# Patient Record
Sex: FEMALE | Race: BLACK OR AFRICAN AMERICAN | NOT HISPANIC OR LATINO | Employment: FULL TIME | URBAN - METROPOLITAN AREA
[De-identification: names, ages, dates, MRNs, and addresses within clinical notes are randomized per-mention and may not be internally consistent; named-entity substitution may affect disease eponyms.]

---

## 2023-05-09 ENCOUNTER — OFFICE VISIT (OUTPATIENT)
Dept: PRIMARY CARE | Facility: CLINIC | Age: 48
End: 2023-05-09
Payer: COMMERCIAL

## 2023-05-09 VITALS
SYSTOLIC BLOOD PRESSURE: 142 MMHG | DIASTOLIC BLOOD PRESSURE: 86 MMHG | BODY MASS INDEX: 36.84 KG/M2 | TEMPERATURE: 98.2 F | WEIGHT: 195 LBS | HEART RATE: 64 BPM

## 2023-05-09 DIAGNOSIS — D72.829 LEUKOCYTOSIS, UNSPECIFIED TYPE: ICD-10-CM

## 2023-05-09 DIAGNOSIS — E78.2 MIXED HYPERLIPIDEMIA: ICD-10-CM

## 2023-05-09 DIAGNOSIS — I10 PRIMARY HYPERTENSION: Primary | ICD-10-CM

## 2023-05-09 PROCEDURE — 3077F SYST BP >= 140 MM HG: CPT | Performed by: INTERNAL MEDICINE

## 2023-05-09 PROCEDURE — 3079F DIAST BP 80-89 MM HG: CPT | Performed by: INTERNAL MEDICINE

## 2023-05-09 PROCEDURE — 99213 OFFICE O/P EST LOW 20 MIN: CPT | Performed by: INTERNAL MEDICINE

## 2023-05-09 RX ORDER — LISINOPRIL AND HYDROCHLOROTHIAZIDE 12.5; 2 MG/1; MG/1
2 TABLET ORAL DAILY
Qty: 180 TABLET | Refills: 1 | Status: SHIPPED | OUTPATIENT
Start: 2023-05-09 | End: 2024-01-15 | Stop reason: SDUPTHER

## 2023-05-09 RX ORDER — LISINOPRIL AND HYDROCHLOROTHIAZIDE 12.5; 2 MG/1; MG/1
1 TABLET ORAL DAILY
COMMUNITY
End: 2023-05-09 | Stop reason: SDUPTHER

## 2023-05-09 RX ORDER — MULTIVITAMIN
1 TABLET ORAL DAILY
COMMUNITY
Start: 2020-02-03

## 2023-05-09 ASSESSMENT — ENCOUNTER SYMPTOMS
HEADACHES: 0
SEIZURES: 0
CARDIOVASCULAR NEGATIVE: 1
CONSTITUTIONAL NEGATIVE: 1
ALLERGIC/IMMUNOLOGIC NEGATIVE: 1
PSYCHIATRIC NEGATIVE: 1
MUSCULOSKELETAL NEGATIVE: 1
ENDOCRINE NEGATIVE: 1
EYES NEGATIVE: 1
GASTROINTESTINAL NEGATIVE: 1
RESPIRATORY NEGATIVE: 1

## 2023-05-09 NOTE — PROGRESS NOTES
Subjective   Patient ID: Viv Baer is a 48 y.o. female who presents for Follow-up (Pt here for office visit).      No data recorded    Patient blood pressure is improved but still 8 needs to go down she is on lisinopril hydrochlorothiazide she smokes 1 pack of cigarettes daily          Review of Systems   Constitutional: Negative.    HENT: Negative.     Eyes: Negative.    Respiratory: Negative.     Cardiovascular: Negative.    Gastrointestinal: Negative.    Endocrine: Negative.    Genitourinary: Negative.    Musculoskeletal: Negative.    Allergic/Immunologic: Negative.    Neurological:  Negative for seizures and headaches.   Psychiatric/Behavioral: Negative.     All other systems reviewed and are negative.      Objective   /86   Pulse 64   Temp 36.8 °C (98.2 °F) (Temporal)   Wt 88.5 kg (195 lb)   BMI 36.84 kg/m²     Physical Exam  Vitals reviewed.   Constitutional:       Appearance: Normal appearance.   HENT:      Head: Normocephalic.   Cardiovascular:      Rate and Rhythm: Normal rate and regular rhythm.   Musculoskeletal:         General: Normal range of motion.   Neurological:      General: No focal deficit present.      Mental Status: She is alert and oriented to person, place, and time.      Cranial Nerves: No cranial nerve deficit.   Psychiatric:         Mood and Affect: Mood normal.         Thought Content: Thought content normal.         Assessment/Plan   Problem List Items Addressed This Visit    None  Visit Diagnoses       Primary hypertension    -  Primary    Relevant Medications    lisinopriL-hydrochlorothiazide 20-12.5 mg tablet    Leukocytosis, unspecified type        Relevant Orders    CBC and Auto Differential    Mixed hyperlipidemia        Relevant Orders    Comprehensive Metabolic Panel    Lipid Panel          Patient advised to quit smoking.  Also get established with a gynecologist since she has family history of ovarian cancer in her mother she was advised to get mammograms yearly  she does have dense breast tissue so she needs to establish with gynecologist   She should follow DASH diet and we have increased lisinopril to 2 tablets daily

## 2023-06-06 ENCOUNTER — LAB (OUTPATIENT)
Dept: LAB | Facility: LAB | Age: 48
End: 2023-06-06
Payer: COMMERCIAL

## 2023-06-06 DIAGNOSIS — D72.829 LEUKOCYTOSIS, UNSPECIFIED TYPE: ICD-10-CM

## 2023-06-06 DIAGNOSIS — E78.2 MIXED HYPERLIPIDEMIA: ICD-10-CM

## 2023-06-06 LAB
ALANINE AMINOTRANSFERASE (SGPT) (U/L) IN SER/PLAS: 10 U/L (ref 7–45)
ALBUMIN (G/DL) IN SER/PLAS: 4.2 G/DL (ref 3.4–5)
ALKALINE PHOSPHATASE (U/L) IN SER/PLAS: 53 U/L (ref 33–110)
ANION GAP IN SER/PLAS: 14 MMOL/L (ref 10–20)
ASPARTATE AMINOTRANSFERASE (SGOT) (U/L) IN SER/PLAS: 16 U/L (ref 9–39)
BASOPHILS (10*3/UL) IN BLOOD BY AUTOMATED COUNT: 0.04 X10E9/L (ref 0–0.1)
BASOPHILS/100 LEUKOCYTES IN BLOOD BY AUTOMATED COUNT: 0.3 % (ref 0–2)
BILIRUBIN TOTAL (MG/DL) IN SER/PLAS: 0.7 MG/DL (ref 0–1.2)
CALCIUM (MG/DL) IN SER/PLAS: 9.1 MG/DL (ref 8.6–10.3)
CARBON DIOXIDE, TOTAL (MMOL/L) IN SER/PLAS: 24 MMOL/L (ref 21–32)
CHLORIDE (MMOL/L) IN SER/PLAS: 104 MMOL/L (ref 98–107)
CHOLESTEROL (MG/DL) IN SER/PLAS: 206 MG/DL (ref 0–199)
CHOLESTEROL IN HDL (MG/DL) IN SER/PLAS: 44.9 MG/DL
CHOLESTEROL/HDL RATIO: 4.6
CREATININE (MG/DL) IN SER/PLAS: 0.82 MG/DL (ref 0.5–1.05)
EOSINOPHILS (10*3/UL) IN BLOOD BY AUTOMATED COUNT: 0.21 X10E9/L (ref 0–0.7)
EOSINOPHILS/100 LEUKOCYTES IN BLOOD BY AUTOMATED COUNT: 1.7 % (ref 0–6)
ERYTHROCYTE DISTRIBUTION WIDTH (RATIO) BY AUTOMATED COUNT: 14.4 % (ref 11.5–14.5)
ERYTHROCYTE MEAN CORPUSCULAR HEMOGLOBIN CONCENTRATION (G/DL) BY AUTOMATED: 32.2 G/DL (ref 32–36)
ERYTHROCYTE MEAN CORPUSCULAR VOLUME (FL) BY AUTOMATED COUNT: 93 FL (ref 80–100)
ERYTHROCYTES (10*6/UL) IN BLOOD BY AUTOMATED COUNT: 4.85 X10E12/L (ref 4–5.2)
GFR FEMALE: 87 ML/MIN/1.73M2
GLUCOSE (MG/DL) IN SER/PLAS: 77 MG/DL (ref 74–99)
HEMATOCRIT (%) IN BLOOD BY AUTOMATED COUNT: 45.1 % (ref 36–46)
HEMOGLOBIN (G/DL) IN BLOOD: 14.5 G/DL (ref 12–16)
IMMATURE GRANULOCYTES/100 LEUKOCYTES IN BLOOD BY AUTOMATED COUNT: 0.3 % (ref 0–0.9)
LDL: 144 MG/DL (ref 0–99)
LEUKOCYTES (10*3/UL) IN BLOOD BY AUTOMATED COUNT: 12.1 X10E9/L (ref 4.4–11.3)
LYMPHOCYTES (10*3/UL) IN BLOOD BY AUTOMATED COUNT: 4.01 X10E9/L (ref 1.2–4.8)
LYMPHOCYTES/100 LEUKOCYTES IN BLOOD BY AUTOMATED COUNT: 33.3 % (ref 13–44)
MONOCYTES (10*3/UL) IN BLOOD BY AUTOMATED COUNT: 0.78 X10E9/L (ref 0.1–1)
MONOCYTES/100 LEUKOCYTES IN BLOOD BY AUTOMATED COUNT: 6.5 % (ref 2–10)
NEUTROPHILS (10*3/UL) IN BLOOD BY AUTOMATED COUNT: 6.97 X10E9/L (ref 1.2–7.7)
NEUTROPHILS/100 LEUKOCYTES IN BLOOD BY AUTOMATED COUNT: 57.9 % (ref 40–80)
PLATELETS (10*3/UL) IN BLOOD AUTOMATED COUNT: 207 X10E9/L (ref 150–450)
POTASSIUM (MMOL/L) IN SER/PLAS: 4.5 MMOL/L (ref 3.5–5.3)
PROTEIN TOTAL: 7.1 G/DL (ref 6.4–8.2)
SODIUM (MMOL/L) IN SER/PLAS: 137 MMOL/L (ref 136–145)
TRIGLYCERIDE (MG/DL) IN SER/PLAS: 87 MG/DL (ref 0–149)
UREA NITROGEN (MG/DL) IN SER/PLAS: 16 MG/DL (ref 6–23)
VLDL: 17 MG/DL (ref 0–40)

## 2023-06-06 PROCEDURE — 36415 COLL VENOUS BLD VENIPUNCTURE: CPT

## 2023-06-06 PROCEDURE — 80061 LIPID PANEL: CPT

## 2023-06-06 PROCEDURE — 80053 COMPREHEN METABOLIC PANEL: CPT

## 2023-06-06 PROCEDURE — 85025 COMPLETE CBC W/AUTO DIFF WBC: CPT

## 2023-09-12 ENCOUNTER — OFFICE VISIT (OUTPATIENT)
Dept: PRIMARY CARE | Facility: CLINIC | Age: 48
End: 2023-09-12
Payer: COMMERCIAL

## 2023-09-12 VITALS
WEIGHT: 197 LBS | HEART RATE: 68 BPM | BODY MASS INDEX: 37.22 KG/M2 | DIASTOLIC BLOOD PRESSURE: 70 MMHG | SYSTOLIC BLOOD PRESSURE: 120 MMHG | TEMPERATURE: 97.9 F

## 2023-09-12 DIAGNOSIS — E78.2 MIXED HYPERLIPIDEMIA: ICD-10-CM

## 2023-09-12 DIAGNOSIS — Z12.11 COLON CANCER SCREENING: ICD-10-CM

## 2023-09-12 DIAGNOSIS — I10 PRIMARY HYPERTENSION: Primary | ICD-10-CM

## 2023-09-12 PROBLEM — F17.200 SMOKER: Status: ACTIVE | Noted: 2023-09-12

## 2023-09-12 PROCEDURE — 99213 OFFICE O/P EST LOW 20 MIN: CPT | Performed by: INTERNAL MEDICINE

## 2023-09-12 PROCEDURE — 3078F DIAST BP <80 MM HG: CPT | Performed by: INTERNAL MEDICINE

## 2023-09-12 PROCEDURE — 3074F SYST BP LT 130 MM HG: CPT | Performed by: INTERNAL MEDICINE

## 2023-09-12 ASSESSMENT — ENCOUNTER SYMPTOMS
GASTROINTESTINAL NEGATIVE: 1
EYES NEGATIVE: 1
RESPIRATORY NEGATIVE: 1
CARDIOVASCULAR NEGATIVE: 1
NEUROLOGICAL NEGATIVE: 1
PSYCHIATRIC NEGATIVE: 1
HEMATOLOGIC/LYMPHATIC NEGATIVE: 1

## 2023-09-12 NOTE — PROGRESS NOTES
Subjective   Patient ID: Viv Baer is a 48 y.o. female who presents for Follow-up (Pt here for office visit and lab results).    HPI and here for office visit she has history of hypertension plus she is a smoker 1 pack of cigarettes daily she had colonoscopy done about 8 to 10 years ago.    Review of Systems   HENT: Negative.     Eyes: Negative.    Respiratory: Negative.     Cardiovascular: Negative.    Gastrointestinal: Negative.    Genitourinary: Negative.    Neurological: Negative.    Hematological: Negative.    Psychiatric/Behavioral: Negative.     All other systems reviewed and are negative.      Objective   /70   Pulse 68   Temp 36.6 °C (97.9 °F) (Temporal)   Wt 89.4 kg (197 lb)   BMI 37.22 kg/m²     Physical Exam  Cardiovascular:      Rate and Rhythm: Normal rate and regular rhythm.   Musculoskeletal:         General: Normal range of motion.   Neurological:      General: No focal deficit present.      Mental Status: She is oriented to person, place, and time.   Psychiatric:         Mood and Affect: Mood normal.         Behavior: Behavior normal.       Assessment/Plan   Problem List Items Addressed This Visit       Primary hypertension - Primary    Mixed hyperlipidemia     Patient advised about smoking cessation she will continue lisinopril hydrochlorothiazide for hypertension patient also advised about repeat colonoscopy for colon cancer screening.

## 2024-01-15 DIAGNOSIS — I10 PRIMARY HYPERTENSION: ICD-10-CM

## 2024-01-16 RX ORDER — LISINOPRIL AND HYDROCHLOROTHIAZIDE 12.5; 2 MG/1; MG/1
2 TABLET ORAL DAILY
Qty: 180 TABLET | Refills: 1 | Status: SHIPPED | OUTPATIENT
Start: 2024-01-16 | End: 2024-03-12 | Stop reason: SINTOL

## 2024-03-05 ENCOUNTER — APPOINTMENT (OUTPATIENT)
Dept: SURGERY | Facility: CLINIC | Age: 49
End: 2024-03-05
Payer: COMMERCIAL

## 2024-03-12 ENCOUNTER — OFFICE VISIT (OUTPATIENT)
Dept: PRIMARY CARE | Facility: CLINIC | Age: 49
End: 2024-03-12
Payer: COMMERCIAL

## 2024-03-12 VITALS
DIASTOLIC BLOOD PRESSURE: 80 MMHG | HEART RATE: 74 BPM | SYSTOLIC BLOOD PRESSURE: 136 MMHG | BODY MASS INDEX: 36.28 KG/M2 | WEIGHT: 192 LBS

## 2024-03-12 DIAGNOSIS — E78.2 MIXED HYPERLIPIDEMIA: ICD-10-CM

## 2024-03-12 DIAGNOSIS — I10 PRIMARY HYPERTENSION: Primary | ICD-10-CM

## 2024-03-12 DIAGNOSIS — R53.83 OTHER FATIGUE: ICD-10-CM

## 2024-03-12 DIAGNOSIS — R05.3 CHRONIC COUGH: ICD-10-CM

## 2024-03-12 PROCEDURE — 3075F SYST BP GE 130 - 139MM HG: CPT | Performed by: INTERNAL MEDICINE

## 2024-03-12 PROCEDURE — 3079F DIAST BP 80-89 MM HG: CPT | Performed by: INTERNAL MEDICINE

## 2024-03-12 PROCEDURE — 99213 OFFICE O/P EST LOW 20 MIN: CPT | Performed by: INTERNAL MEDICINE

## 2024-03-12 RX ORDER — LOSARTAN POTASSIUM AND HYDROCHLOROTHIAZIDE 12.5; 5 MG/1; MG/1
1 TABLET ORAL DAILY
Qty: 90 TABLET | Refills: 1 | Status: SHIPPED | OUTPATIENT
Start: 2024-03-12 | End: 2024-09-08

## 2024-03-12 ASSESSMENT — ENCOUNTER SYMPTOMS
CONSTITUTIONAL NEGATIVE: 1
MUSCULOSKELETAL NEGATIVE: 1
CARDIOVASCULAR NEGATIVE: 1
PSYCHIATRIC NEGATIVE: 1
HYPERTENSION: 1
RESPIRATORY NEGATIVE: 1
GASTROINTESTINAL NEGATIVE: 1
ENDOCRINE NEGATIVE: 1
ALLERGIC/IMMUNOLOGIC NEGATIVE: 1
NEUROLOGICAL NEGATIVE: 1

## 2024-03-12 NOTE — PROGRESS NOTES
Subjective   Patient ID: Viv Baer is a 48 y.o. female who presents for Hypertension and Cough.    Hypertension    Patient here for office visit she has hypertension she does have chronic cough she is a smoker she thinks her cough is due to lisinopril which we will change she was advised to get chest x-ray done stop smoking and if the cough persist get CAT scan of the chest    Review of Systems   Constitutional: Negative.    HENT: Negative.     Respiratory: Negative.     Cardiovascular: Negative.    Gastrointestinal: Negative.    Endocrine: Negative.    Genitourinary: Negative.    Musculoskeletal: Negative.    Allergic/Immunologic: Negative.    Neurological: Negative.    Psychiatric/Behavioral: Negative.     All other systems reviewed and are negative.      Objective   /80   Pulse 74   Wt 87.1 kg (192 lb)   BMI 36.28 kg/m²     Physical Exam  Vitals reviewed.   Constitutional:       Appearance: Normal appearance.   HENT:      Head: Normocephalic.      Nose: Nose normal.   Eyes:      Pupils: Pupils are equal, round, and reactive to light.   Neurological:      Mental Status: She is alert.         Assessment/Plan   Problem List Items Addressed This Visit             ICD-10-CM    Primary hypertension - Primary I10    Relevant Medications    losartan-hydrochlorothiazide (Hyzaar) 50-12.5 mg tablet    Mixed hyperlipidemia E78.2    Relevant Orders    Cholesterol, LDL Direct    Lipid Panel    Comprehensive Metabolic Panel     Other Visit Diagnoses         Codes    Other fatigue     R53.83    Relevant Orders    CBC and Auto Differential    Vitamin D 25-Hydroxy,Total (for eval of Vitamin D levels)    Chronic cough     R05.3    Relevant Orders    XR chest 2 views          Chest x-ray ordered smoking cessation advised patient was given options to quit smoking patient blood pressure medicine changed from lisinopril to losartan hydrochlorothiazide she was also advised to get annual mammogram and screening colonoscopy  for cancer screening also advised to get LDCT for lung cancer screening advise regular follow-up following DASH diet and healthy lifestyle encouraged and will be helpful

## 2024-03-14 DIAGNOSIS — I10 PRIMARY HYPERTENSION: ICD-10-CM

## 2024-06-18 ENCOUNTER — APPOINTMENT (OUTPATIENT)
Dept: PRIMARY CARE | Facility: CLINIC | Age: 49
End: 2024-06-18
Payer: COMMERCIAL

## 2024-06-18 ENCOUNTER — LAB (OUTPATIENT)
Dept: LAB | Facility: LAB | Age: 49
End: 2024-06-18
Payer: COMMERCIAL

## 2024-06-18 VITALS
BODY MASS INDEX: 37.95 KG/M2 | DIASTOLIC BLOOD PRESSURE: 76 MMHG | WEIGHT: 201 LBS | TEMPERATURE: 97.9 F | HEIGHT: 61 IN | SYSTOLIC BLOOD PRESSURE: 128 MMHG | HEART RATE: 72 BPM

## 2024-06-18 DIAGNOSIS — J20.9 ACUTE BRONCHITIS, UNSPECIFIED ORGANISM: ICD-10-CM

## 2024-06-18 DIAGNOSIS — I10 PRIMARY HYPERTENSION: ICD-10-CM

## 2024-06-18 DIAGNOSIS — E78.2 MIXED HYPERLIPIDEMIA: ICD-10-CM

## 2024-06-18 DIAGNOSIS — F17.200 SMOKER: ICD-10-CM

## 2024-06-18 DIAGNOSIS — R53.83 OTHER FATIGUE: ICD-10-CM

## 2024-06-18 DIAGNOSIS — Z00.00 ANNUAL PHYSICAL EXAM: Primary | ICD-10-CM

## 2024-06-18 LAB
25(OH)D3 SERPL-MCNC: 31 NG/ML (ref 30–100)
ALBUMIN SERPL BCP-MCNC: 4.3 G/DL (ref 3.4–5)
ALP SERPL-CCNC: 71 U/L (ref 33–110)
ALT SERPL W P-5'-P-CCNC: 11 U/L (ref 7–45)
ANION GAP SERPL CALC-SCNC: 10 MMOL/L (ref 10–20)
AST SERPL W P-5'-P-CCNC: 15 U/L (ref 9–39)
BASOPHILS # BLD AUTO: 0.04 X10*3/UL (ref 0–0.1)
BASOPHILS NFR BLD AUTO: 0.3 %
BILIRUB SERPL-MCNC: 0.4 MG/DL (ref 0–1.2)
BUN SERPL-MCNC: 15 MG/DL (ref 6–23)
CALCIUM SERPL-MCNC: 9.5 MG/DL (ref 8.6–10.3)
CHLORIDE SERPL-SCNC: 105 MMOL/L (ref 98–107)
CHOLEST SERPL-MCNC: 205 MG/DL (ref 0–199)
CHOLESTEROL/HDL RATIO: 4.5
CO2 SERPL-SCNC: 29 MMOL/L (ref 21–32)
CREAT SERPL-MCNC: 0.77 MG/DL (ref 0.5–1.05)
EGFRCR SERPLBLD CKD-EPI 2021: >90 ML/MIN/1.73M*2
EOSINOPHIL # BLD AUTO: 0.31 X10*3/UL (ref 0–0.7)
EOSINOPHIL NFR BLD AUTO: 2.4 %
ERYTHROCYTE [DISTWIDTH] IN BLOOD BY AUTOMATED COUNT: 13.6 % (ref 11.5–14.5)
GLUCOSE SERPL-MCNC: 89 MG/DL (ref 74–99)
HCT VFR BLD AUTO: 43.5 % (ref 36–46)
HDLC SERPL-MCNC: 45.6 MG/DL
HGB BLD-MCNC: 14.1 G/DL (ref 12–16)
IMM GRANULOCYTES # BLD AUTO: 0.03 X10*3/UL (ref 0–0.7)
IMM GRANULOCYTES NFR BLD AUTO: 0.2 % (ref 0–0.9)
LDLC SERPL CALC-MCNC: 126 MG/DL
LYMPHOCYTES # BLD AUTO: 3.94 X10*3/UL (ref 1.2–4.8)
LYMPHOCYTES NFR BLD AUTO: 30 %
MCH RBC QN AUTO: 30 PG (ref 26–34)
MCHC RBC AUTO-ENTMCNC: 32.4 G/DL (ref 32–36)
MCV RBC AUTO: 93 FL (ref 80–100)
MONOCYTES # BLD AUTO: 1.01 X10*3/UL (ref 0.1–1)
MONOCYTES NFR BLD AUTO: 7.7 %
NEUTROPHILS # BLD AUTO: 7.8 X10*3/UL (ref 1.2–7.7)
NEUTROPHILS NFR BLD AUTO: 59.4 %
NON HDL CHOLESTEROL: 159 MG/DL (ref 0–149)
NRBC BLD-RTO: 0 /100 WBCS (ref 0–0)
PLATELET # BLD AUTO: 298 X10*3/UL (ref 150–450)
POTASSIUM SERPL-SCNC: 4 MMOL/L (ref 3.5–5.3)
PROT SERPL-MCNC: 7 G/DL (ref 6.4–8.2)
RBC # BLD AUTO: 4.7 X10*6/UL (ref 4–5.2)
SODIUM SERPL-SCNC: 140 MMOL/L (ref 136–145)
TRIGL SERPL-MCNC: 168 MG/DL (ref 0–149)
VLDL: 34 MG/DL (ref 0–40)
WBC # BLD AUTO: 13.1 X10*3/UL (ref 4.4–11.3)

## 2024-06-18 PROCEDURE — 99396 PREV VISIT EST AGE 40-64: CPT | Performed by: INTERNAL MEDICINE

## 2024-06-18 PROCEDURE — 82306 VITAMIN D 25 HYDROXY: CPT

## 2024-06-18 PROCEDURE — 80061 LIPID PANEL: CPT

## 2024-06-18 PROCEDURE — 83721 ASSAY OF BLOOD LIPOPROTEIN: CPT

## 2024-06-18 PROCEDURE — 85025 COMPLETE CBC W/AUTO DIFF WBC: CPT

## 2024-06-18 PROCEDURE — 36415 COLL VENOUS BLD VENIPUNCTURE: CPT

## 2024-06-18 PROCEDURE — 3078F DIAST BP <80 MM HG: CPT | Performed by: INTERNAL MEDICINE

## 2024-06-18 PROCEDURE — 3074F SYST BP LT 130 MM HG: CPT | Performed by: INTERNAL MEDICINE

## 2024-06-18 PROCEDURE — 80053 COMPREHEN METABOLIC PANEL: CPT

## 2024-06-18 RX ORDER — BENZONATATE 100 MG/1
100 CAPSULE ORAL 3 TIMES DAILY PRN
Qty: 42 CAPSULE | Refills: 0 | Status: SHIPPED | OUTPATIENT
Start: 2024-06-18 | End: 2024-07-18

## 2024-06-18 RX ORDER — BENZONATATE 100 MG/1
100 CAPSULE ORAL 3 TIMES DAILY PRN
Qty: 42 CAPSULE | Refills: 0 | Status: SHIPPED | OUTPATIENT
Start: 2024-06-18 | End: 2024-06-18

## 2024-06-18 ASSESSMENT — ENCOUNTER SYMPTOMS
COUGH: 1
NEUROLOGICAL NEGATIVE: 1
CONSTITUTIONAL NEGATIVE: 1
PSYCHIATRIC NEGATIVE: 1
EYES NEGATIVE: 1
MUSCULOSKELETAL NEGATIVE: 1

## 2024-06-18 NOTE — PROGRESS NOTES
"Subjective   Patient ID: Viv Baer is a 49 y.o. female who presents for Annual Exam (Pt here for yearly physical  and bp follow up).    HPI   Patient here for wellness exam.  She has history of hypertension.  She had respiratory infection for 1-1/2 weeks initially with nasal sinus congestion and then cough with colored phlegm now only cough no fever no chills she did not get tested for any COVID or RSV  Review of Systems   Constitutional: Negative.    HENT:  Positive for congestion.    Eyes: Negative.    Respiratory:  Positive for cough.    Genitourinary: Negative.    Musculoskeletal: Negative.    Neurological: Negative.    Psychiatric/Behavioral: Negative.     All other systems reviewed and are negative.      Objective   /76   Pulse 72   Temp 36.6 °C (97.9 °F) (Temporal)   Ht 1.549 m (5' 1\")   Wt 91.2 kg (201 lb)   BMI 37.98 kg/m²     Physical Exam  Vitals reviewed.   Constitutional:       Appearance: Normal appearance.   HENT:      Head: Atraumatic.   Eyes:      Conjunctiva/sclera: Conjunctivae normal.   Cardiovascular:      Rate and Rhythm: Normal rate.      Pulses: Normal pulses.   Pulmonary:      Effort: Pulmonary effort is normal.      Breath sounds: Normal breath sounds.   Lymphadenopathy:      Cervical: No cervical adenopathy.   Neurological:      General: No focal deficit present.      Mental Status: She is oriented to person, place, and time.   Psychiatric:         Mood and Affect: Mood normal.         Thought Content: Thought content normal.         Assessment/Plan   Problem List Items Addressed This Visit             ICD-10-CM    Primary hypertension I10    Mixed hyperlipidemia E78.2    Annual physical exam - Primary Z00.00     Other Visit Diagnoses         Codes    Acute bronchitis, unspecified organism     J20.9    Relevant Medications    benzonatate (Tessalon) 100 mg capsule          Lung exam unremarkable no need for x-ray or antibiotic he gave her benzonatate for symptomatic relief.  " Patient has 18-pack-year history of smoking if she does not quit she will require LDCT for lung cancer screening we also advised annual mammograms and also screening colonoscopy for colon cancer screening.  Continue losartan hydrochlorothiazide for hypertension.

## 2024-06-19 LAB — LDLC SERPL DIRECT ASSAY-MCNC: 143 MG/DL (ref 0–129)

## 2024-10-18 ENCOUNTER — APPOINTMENT (OUTPATIENT)
Dept: PRIMARY CARE | Facility: CLINIC | Age: 49
End: 2024-10-18
Payer: COMMERCIAL

## 2024-10-18 VITALS
SYSTOLIC BLOOD PRESSURE: 136 MMHG | BODY MASS INDEX: 37.41 KG/M2 | DIASTOLIC BLOOD PRESSURE: 74 MMHG | WEIGHT: 198 LBS | TEMPERATURE: 97.3 F | HEART RATE: 64 BPM

## 2024-10-18 DIAGNOSIS — E78.2 MIXED HYPERLIPIDEMIA: ICD-10-CM

## 2024-10-18 DIAGNOSIS — I10 PRIMARY HYPERTENSION: Primary | ICD-10-CM

## 2024-10-18 DIAGNOSIS — F17.200 SMOKER: ICD-10-CM

## 2024-10-18 PROCEDURE — 3075F SYST BP GE 130 - 139MM HG: CPT | Performed by: INTERNAL MEDICINE

## 2024-10-18 PROCEDURE — 3078F DIAST BP <80 MM HG: CPT | Performed by: INTERNAL MEDICINE

## 2024-10-18 PROCEDURE — 99213 OFFICE O/P EST LOW 20 MIN: CPT | Performed by: INTERNAL MEDICINE

## 2024-10-18 RX ORDER — ROSUVASTATIN CALCIUM 5 MG/1
5 TABLET, COATED ORAL DAILY
Qty: 30 TABLET | Refills: 3 | Status: SHIPPED | OUTPATIENT
Start: 2024-10-18 | End: 2025-02-15

## 2024-10-18 ASSESSMENT — ENCOUNTER SYMPTOMS
HEMATOLOGIC/LYMPHATIC NEGATIVE: 1
ENDOCRINE NEGATIVE: 1
NEUROLOGICAL NEGATIVE: 1
CARDIOVASCULAR NEGATIVE: 1
PSYCHIATRIC NEGATIVE: 1
GASTROINTESTINAL NEGATIVE: 1
EYES NEGATIVE: 1
CONSTITUTIONAL NEGATIVE: 1
RESPIRATORY NEGATIVE: 1

## 2024-10-18 ASSESSMENT — PATIENT HEALTH QUESTIONNAIRE - PHQ9
SUM OF ALL RESPONSES TO PHQ9 QUESTIONS 1 & 2: 0
1. LITTLE INTEREST OR PLEASURE IN DOING THINGS: NOT AT ALL
2. FEELING DOWN, DEPRESSED OR HOPELESS: NOT AT ALL

## 2024-10-18 NOTE — PROGRESS NOTES
Subjective   Patient ID: Viv Baer is a 49 y.o. female who presents for Follow-up (routine).    HPI   Patient here for office visit she did not get colonoscopy done yet still smokes pack of cigarettes daily blood pressure improved but still on the high side.  Review of Systems   Constitutional: Negative.    HENT: Negative.     Eyes: Negative.    Respiratory: Negative.     Cardiovascular: Negative.    Gastrointestinal: Negative.    Endocrine: Negative.    Genitourinary: Negative.    Neurological: Negative.    Hematological: Negative.    Psychiatric/Behavioral: Negative.     All other systems reviewed and are negative.      Objective   /74   Pulse 64   Temp 36.3 °C (97.3 °F) (Temporal)   Wt 89.8 kg (198 lb)   LMP 10/07/2024   BMI 37.41 kg/m²     Physical Exam  Vitals reviewed.   Constitutional:       Appearance: Normal appearance.   HENT:      Head: Normocephalic.   Cardiovascular:      Rate and Rhythm: Normal rate and regular rhythm.   Pulmonary:      Effort: Pulmonary effort is normal.      Breath sounds: Normal breath sounds.   Musculoskeletal:      Right lower leg: No edema.      Left lower leg: No edema.   Neurological:      General: No focal deficit present.      Mental Status: She is alert and oriented to person, place, and time. Mental status is at baseline.      Sensory: No sensory deficit.       Assessment/Plan   Problem List Items Addressed This Visit             ICD-10-CM    Primary hypertension - Primary I10    Smoker F17.200    Mixed hyperlipidemia E78.2     We advised smoking cessation.  Continue losartan hydrochlorothiazide for hypertension continue statins for hyperlipidemia we advised her about annual mammograms for breast cancer screening and also advised her to get colonoscopy for colon cancer screening

## 2024-12-10 DIAGNOSIS — I10 PRIMARY HYPERTENSION: ICD-10-CM

## 2024-12-10 RX ORDER — LOSARTAN POTASSIUM AND HYDROCHLOROTHIAZIDE 12.5; 5 MG/1; MG/1
1 TABLET ORAL DAILY
Qty: 90 TABLET | Refills: 1 | Status: SHIPPED | OUTPATIENT
Start: 2024-12-10 | End: 2025-06-08

## 2025-02-18 ENCOUNTER — APPOINTMENT (OUTPATIENT)
Dept: PRIMARY CARE | Facility: CLINIC | Age: 50
End: 2025-02-18
Payer: COMMERCIAL

## 2025-02-18 VITALS
BODY MASS INDEX: 38.17 KG/M2 | DIASTOLIC BLOOD PRESSURE: 74 MMHG | TEMPERATURE: 97.7 F | HEART RATE: 64 BPM | WEIGHT: 202 LBS | SYSTOLIC BLOOD PRESSURE: 132 MMHG

## 2025-02-18 DIAGNOSIS — I10 PRIMARY HYPERTENSION: Primary | ICD-10-CM

## 2025-02-18 DIAGNOSIS — E78.2 MIXED HYPERLIPIDEMIA: ICD-10-CM

## 2025-02-18 DIAGNOSIS — Z12.31 ENCOUNTER FOR SCREENING MAMMOGRAM FOR MALIGNANT NEOPLASM OF BREAST: ICD-10-CM

## 2025-02-18 DIAGNOSIS — F17.200 SMOKER: ICD-10-CM

## 2025-02-18 PROCEDURE — 3078F DIAST BP <80 MM HG: CPT | Performed by: INTERNAL MEDICINE

## 2025-02-18 PROCEDURE — 99213 OFFICE O/P EST LOW 20 MIN: CPT | Performed by: INTERNAL MEDICINE

## 2025-02-18 PROCEDURE — 3075F SYST BP GE 130 - 139MM HG: CPT | Performed by: INTERNAL MEDICINE

## 2025-02-18 ASSESSMENT — ENCOUNTER SYMPTOMS
HEMATOLOGIC/LYMPHATIC NEGATIVE: 1
EYES NEGATIVE: 1
PSYCHIATRIC NEGATIVE: 1
CONSTITUTIONAL NEGATIVE: 1
ENDOCRINE NEGATIVE: 1
NEUROLOGICAL NEGATIVE: 1
RESPIRATORY NEGATIVE: 1

## 2025-02-18 NOTE — PROGRESS NOTES
Subjective   Patient ID: Viv Baer is a 49 y.o. female who presents for Follow-up (Pt here for office visit will get labs done).    HPI cough went away ever since we change lisinopril to losartan.  Patient smokes 1 pack of cigarettes daily she has about 18 years pack year history of smoking.  She from that criteria is not due for LDCT but will need in the next couple years.  Colonoscopy recommended if not done earlier we also recommend stopping smoking    Review of Systems   Constitutional: Negative.    HENT: Negative.     Eyes: Negative.    Respiratory: Negative.     Endocrine: Negative.    Genitourinary: Negative.    Neurological: Negative.    Hematological: Negative.    Psychiatric/Behavioral: Negative.     All other systems reviewed and are negative.      Objective   /74   Pulse 64   Temp 36.5 °C (97.7 °F) (Temporal)   Wt 91.6 kg (202 lb)   BMI 38.17 kg/m²     Physical Exam  Vitals reviewed.   Constitutional:       Appearance: Normal appearance.   Neck:      Vascular: No carotid bruit.   Cardiovascular:      Rate and Rhythm: Normal rate and regular rhythm.      Pulses: Normal pulses.      Heart sounds: Normal heart sounds.   Pulmonary:      Effort: Pulmonary effort is normal.      Breath sounds: Normal breath sounds.   Musculoskeletal:      Right lower leg: No edema.      Left lower leg: No edema.   Lymphadenopathy:      Cervical: No cervical adenopathy.   Neurological:      General: No focal deficit present.      Mental Status: She is alert and oriented to person, place, and time.      Cranial Nerves: No cranial nerve deficit.       Assessment/Plan   Problem List Items Addressed This Visit             ICD-10-CM    Primary hypertension - Primary I10    Smoker F17.200    Mixed hyperlipidemia E78.2   We recommend smoking cessation we can offer Wellbutrin or patches for smoking cessation even Chantix can be considered.  Continue losartan hydrochlorothiazide for hypertension continue rosuvastatin for  hyperlipidemia follow-up with us in June or July for wellness exam.

## 2025-02-21 ENCOUNTER — APPOINTMENT (OUTPATIENT)
Dept: RADIOLOGY | Facility: CLINIC | Age: 50
End: 2025-02-21
Payer: COMMERCIAL

## 2025-02-26 LAB
ALBUMIN SERPL-MCNC: 4.3 G/DL (ref 3.6–5.1)
ALP SERPL-CCNC: 63 U/L (ref 37–153)
ALT SERPL-CCNC: 10 U/L (ref 6–29)
ANION GAP SERPL CALCULATED.4IONS-SCNC: 8 MMOL/L (CALC) (ref 7–17)
AST SERPL-CCNC: 13 U/L (ref 10–35)
BILIRUB SERPL-MCNC: 0.5 MG/DL (ref 0.2–1.2)
BUN SERPL-MCNC: 16 MG/DL (ref 7–25)
CALCIUM SERPL-MCNC: 9.4 MG/DL (ref 8.6–10.4)
CHLORIDE SERPL-SCNC: 104 MMOL/L (ref 98–110)
CHOLEST SERPL-MCNC: 210 MG/DL
CHOLEST/HDLC SERPL: 4.1 (CALC)
CO2 SERPL-SCNC: 29 MMOL/L (ref 20–32)
CREAT SERPL-MCNC: 0.79 MG/DL (ref 0.5–1.03)
EGFRCR SERPLBLD CKD-EPI 2021: 91 ML/MIN/1.73M2
GLUCOSE SERPL-MCNC: 92 MG/DL (ref 65–99)
HDLC SERPL-MCNC: 51 MG/DL
LDLC SERPL CALC-MCNC: 140 MG/DL (CALC)
LDLC SERPL DIRECT ASSAY-MCNC: 151 MG/DL
NONHDLC SERPL-MCNC: 159 MG/DL (CALC)
POTASSIUM SERPL-SCNC: 4.1 MMOL/L (ref 3.5–5.3)
PROT SERPL-MCNC: 7.1 G/DL (ref 6.1–8.1)
SODIUM SERPL-SCNC: 141 MMOL/L (ref 135–146)
TRIGL SERPL-MCNC: 91 MG/DL

## 2025-04-17 ENCOUNTER — ANCILLARY PROCEDURE (OUTPATIENT)
Dept: URGENT CARE | Age: 50
End: 2025-04-17
Payer: COMMERCIAL

## 2025-04-17 ENCOUNTER — OFFICE VISIT (OUTPATIENT)
Dept: URGENT CARE | Age: 50
End: 2025-04-17
Payer: COMMERCIAL

## 2025-04-17 VITALS
TEMPERATURE: 98.1 F | WEIGHT: 200 LBS | DIASTOLIC BLOOD PRESSURE: 85 MMHG | OXYGEN SATURATION: 98 % | HEART RATE: 64 BPM | RESPIRATION RATE: 20 BRPM | SYSTOLIC BLOOD PRESSURE: 144 MMHG | BODY MASS INDEX: 37.76 KG/M2 | HEIGHT: 61 IN

## 2025-04-17 DIAGNOSIS — M79.671 ACUTE PAIN OF RIGHT FOOT: Primary | ICD-10-CM

## 2025-04-17 DIAGNOSIS — M77.51 TENDINITIS OF RIGHT FOOT: ICD-10-CM

## 2025-04-17 DIAGNOSIS — M79.671 ACUTE PAIN OF RIGHT FOOT: ICD-10-CM

## 2025-04-17 PROCEDURE — 73630 X-RAY EXAM OF FOOT: CPT | Mod: RIGHT SIDE | Performed by: FAMILY MEDICINE

## 2025-04-17 RX ORDER — DICLOFENAC SODIUM 10 MG/G
4 GEL TOPICAL 4 TIMES DAILY PRN
Qty: 200 G | Refills: 3 | Status: SHIPPED | OUTPATIENT
Start: 2025-04-17

## 2025-04-17 RX ORDER — PREDNISONE 20 MG/1
20 TABLET ORAL 2 TIMES DAILY
Qty: 10 TABLET | Refills: 0 | Status: SHIPPED | OUTPATIENT
Start: 2025-04-17 | End: 2025-04-22

## 2025-04-17 NOTE — PROGRESS NOTES
"Subjective   Patient ID: Viv Baer is a 49 y.o. female. She presents today with a chief complaint of Other (Right foot pain x 3-4 days - no injury - painful & swollen). Patient presents with a 4 day history of right foot pain and swelling. The swelling has been intermittent, worse with ambulating. She denies calf pain, shortness of breath, chest pain and fevers. She has been taking Ibuprofen, which is not helping. She denies any specific injury, but she is concerned about a fracture.    History of Present Illness  HPI    Past Medical History  Allergies as of 04/17/2025 - Reviewed 04/17/2025   Allergen Reaction Noted    Sulfa (sulfonamide antibiotics) Other 05/09/2023       Prescriptions Prior to Admission[1]     Medical History[2]    Surgical History[3]     reports that she has been smoking cigarettes. She has never used smokeless tobacco. She reports current alcohol use. She reports that she does not use drugs.    Review of Systems  Review of Systems                               Objective    Vitals:    04/17/25 1333   BP: 144/85   BP Location: Left arm   Patient Position: Sitting   Pulse: 64   Resp: 20   Temp: 36.7 °C (98.1 °F)   SpO2: 98%   Weight: 90.7 kg (200 lb)   Height: 1.549 m (5' 1\")     Patient's last menstrual period was 03/21/2025 (exact date).    Physical Exam  Vitals and nursing note reviewed.   Constitutional:       General: She is not in acute distress.     Appearance: Normal appearance.   Cardiovascular:      Rate and Rhythm: Normal rate and regular rhythm.      Pulses: Normal pulses.   Musculoskeletal:         General: Normal range of motion.      Right ankle: Normal.      Right Achilles Tendon: Normal.      Right foot: Normal range of motion and normal capillary refill. Swelling and bunion present. No bony tenderness. Normal pulse.      Comments: Mild erythema, moderate edema and tenderness to palpation, dorsum of right foot   Skin:     General: Skin is warm.      Capillary Refill: Capillary " refill takes less than 2 seconds.   Neurological:      Mental Status: She is alert.      Sensory: No sensory deficit.         Procedures    Point of Care Test & Imaging Results from this visit  No results found for this visit on 04/17/25.   Imaging  XR foot right 3+ views  Result Date: 4/17/2025  No acute findings right foot.   Signed by: Trevor Herr 4/17/2025 2:26 PM Dictation workstation:   [id number]      Cardiology, Vascular, and Other Imaging  No other imaging results found for the past 2 days      Diagnostic study results (if any) were reviewed by Dimple Collier MD.    Assessment/Plan   Allergies, medications, history, and pertinent labs/EKGs/Imaging reviewed by Dimple Collier MD.     Medical Decision Making      Orders and Diagnoses  Diagnoses and all orders for this visit:  Acute pain of right foot  -     XR foot right 3+ views; Future  -     predniSONE (Deltasone) 20 mg tablet; Take 1 tablet (20 mg) by mouth 2 times a day for 5 days.  -     diclofenac sodium (Voltaren) 1 % gel; Apply 4.5 inches (4 g) topically 4 times a day as needed (pain).  Tendinitis of right foot      Medical Admin Record      Patient disposition: Home    Electronically signed by Dimple Collier MD  3:26 PM           [1] (Not in a hospital admission)   [2]   Past Medical History:  Diagnosis Date    Cellulitis, unspecified 02/03/2020    MRSA cellulitis    Personal history of other diseases of the respiratory system     History of chronic obstructive lung disease   [3]   Past Surgical History:  Procedure Laterality Date    OTHER SURGICAL HISTORY  02/03/2020    [place] tooth extraction    OTHER SURGICAL HISTORY  02/03/2020    Tubal ligation

## 2025-04-17 NOTE — PATIENT INSTRUCTIONS
Prednisone as directed; take with food  Apply Voltaren as needed  Follow up with new or worsening symptoms

## 2025-05-09 ENCOUNTER — TELEPHONE (OUTPATIENT)
Dept: PRIMARY CARE | Facility: CLINIC | Age: 50
End: 2025-05-09

## 2025-05-09 ENCOUNTER — OFFICE VISIT (OUTPATIENT)
Dept: PRIMARY CARE | Facility: CLINIC | Age: 50
End: 2025-05-09
Payer: COMMERCIAL

## 2025-05-09 VITALS
BODY MASS INDEX: 38.36 KG/M2 | TEMPERATURE: 97.8 F | WEIGHT: 203 LBS | DIASTOLIC BLOOD PRESSURE: 74 MMHG | HEART RATE: 64 BPM | SYSTOLIC BLOOD PRESSURE: 132 MMHG

## 2025-05-09 DIAGNOSIS — M79.671 FOOT PAIN, RIGHT: Primary | ICD-10-CM

## 2025-05-09 PROCEDURE — 3078F DIAST BP <80 MM HG: CPT | Performed by: INTERNAL MEDICINE

## 2025-05-09 PROCEDURE — 3075F SYST BP GE 130 - 139MM HG: CPT | Performed by: INTERNAL MEDICINE

## 2025-05-09 PROCEDURE — 99213 OFFICE O/P EST LOW 20 MIN: CPT | Performed by: INTERNAL MEDICINE

## 2025-05-09 RX ORDER — CELECOXIB 200 MG/1
200 CAPSULE ORAL DAILY
Qty: 30 CAPSULE | Refills: 1 | Status: SHIPPED | OUTPATIENT
Start: 2025-05-09 | End: 2025-05-09

## 2025-05-09 RX ORDER — CELECOXIB 200 MG/1
200 CAPSULE ORAL DAILY
Qty: 30 CAPSULE | Refills: 1 | Status: SHIPPED | OUTPATIENT
Start: 2025-05-09 | End: 2026-05-09

## 2025-05-09 ASSESSMENT — ENCOUNTER SYMPTOMS
CONSTITUTIONAL NEGATIVE: 1
RESPIRATORY NEGATIVE: 1
EYES NEGATIVE: 1
PSYCHIATRIC NEGATIVE: 1
HEMATOLOGIC/LYMPHATIC NEGATIVE: 1
ENDOCRINE NEGATIVE: 1
CARDIOVASCULAR NEGATIVE: 1
GASTROINTESTINAL NEGATIVE: 1

## 2025-05-09 ASSESSMENT — PATIENT HEALTH QUESTIONNAIRE - PHQ9
2. FEELING DOWN, DEPRESSED OR HOPELESS: NOT AT ALL
SUM OF ALL RESPONSES TO PHQ9 QUESTIONS 1 & 2: 0
1. LITTLE INTEREST OR PLEASURE IN DOING THINGS: NOT AT ALL

## 2025-05-09 NOTE — PROGRESS NOTES
Subjective   Patient ID: Viv Baer is a 50 y.o. female who presents for acute (Pt here for rt foot pain ans swelling at times pain goes up to ankle  approx 1 month. Went to [place] urgent care xray dx tendonitis).    HPI   Patient here for acute visit she has been hurting in the fourth and she had x-rays done she was told she has tendinitis.  She gets a lot of pain and sometimes swelling differential diagnosis is stress fracture versus ligamentous injury versus tendon inflammation versus gout  Review of Systems   Constitutional: Negative.    HENT: Negative.     Eyes: Negative.    Respiratory: Negative.     Cardiovascular: Negative.    Gastrointestinal: Negative.    Endocrine: Negative.    Genitourinary: Negative.    Musculoskeletal:         Foot pain   Skin: Negative.    Hematological: Negative.    Psychiatric/Behavioral: Negative.     All other systems reviewed and are negative.      Objective   /74   Pulse 64   Temp 36.6 °C (97.8 °F) (Temporal)   Wt 92.1 kg (203 lb)   LMP 03/21/2025 (Exact Date)   BMI 38.36 kg/m²     Physical Exam  Vitals reviewed.   Constitutional:       Appearance: Normal appearance.   Musculoskeletal:      Comments: Pain on entire dorsum of foot with minimal swelling and slight warmth   Neurological:      Mental Status: She is alert.         Assessment/Plan   Problem List Items Addressed This Visit    None  Visit Diagnoses         Codes      Foot pain, right    -  Primary M79.671    Relevant Medications    celecoxib (CeleBREX) 200 mg capsule    Other Relevant Orders    Uric acid    Sedimentation Rate    Adult Referral to Orthopedics and Sports Medicine        Patient was given Celebrex prescription and advised to take PPI for GI prophylaxis orthopedic consultation ordered uric acid level sed rate has been made.

## 2025-05-11 LAB
ERYTHROCYTE [SEDIMENTATION RATE] IN BLOOD BY WESTERGREN METHOD: 2 MM/H
URATE SERPL-MCNC: 4.8 MG/DL (ref 2.5–7)

## 2025-05-14 ENCOUNTER — OFFICE VISIT (OUTPATIENT)
Dept: ORTHOPEDIC SURGERY | Facility: CLINIC | Age: 50
End: 2025-05-14
Payer: COMMERCIAL

## 2025-05-14 DIAGNOSIS — M21.42 FLAT FEET: ICD-10-CM

## 2025-05-14 DIAGNOSIS — M76.821 RIGHT TIBIALIS TENDINITIS: ICD-10-CM

## 2025-05-14 DIAGNOSIS — M21.41 FLAT FEET: ICD-10-CM

## 2025-05-14 PROCEDURE — L4361 PNEUMA/VAC WALK BOOT PRE OTS: HCPCS | Performed by: STUDENT IN AN ORGANIZED HEALTH CARE EDUCATION/TRAINING PROGRAM

## 2025-05-14 PROCEDURE — 99214 OFFICE O/P EST MOD 30 MIN: CPT | Performed by: STUDENT IN AN ORGANIZED HEALTH CARE EDUCATION/TRAINING PROGRAM

## 2025-05-14 RX ORDER — NAPROXEN 500 MG/1
500 TABLET ORAL
Qty: 28 TABLET | Refills: 1 | Status: SHIPPED | OUTPATIENT
Start: 2025-05-14 | End: 2025-06-11

## 2025-05-14 NOTE — PROGRESS NOTES
Acute Injury New Patient Visit    Patient ID: Viv Baer is a 50 y.o. female.   History of Present Illness  The patient is a 50-year-old female who presents with right foot and ankle pain without any known injury. X-rays were referred by Dr. Abdalla on 04/17/2025.    Right Foot and Ankle Pain  - Persistent pain in right foot for a month, localized to dorsal aspect, occasionally radiating laterally to ankle.  - Accompanied by swelling and erythema.  - No numbness or tingling.  - No history of trauma.  - Pain management with Celebrex daily, Tylenol, and ibuprofen alternately.  - Completed steroid course without relief.    Callus  - Callus on plantar aspect managed by another provider.  - No diabetes.  - Episodes of tripping over toe.    Supplemental information: Work involves standing and walking from 5:15 AM to 3:20 PM, four days a week, exacerbating discomfort.    SOCIAL HISTORY  - Works as a nurse       Assessment & Plan  1. Right tibialis anterior tendinitis  - Prescribe naproxen 500 mg BID for 2 weeks  - Discontinue Celebrex and ibuprofen  - Provide tall boot for 10-14 days  - Order physical therapy  - Advise rest, ice, elevation, use boot when ambulating  - Provide work note until Monday    2. Right posterior tibial tendinitis  - Recommend OTC orthotics for arch support  - Apply same treatment plan as tibialis anterior tendinitis    3. Pes planus  - Recommend OTC orthotics for arch support  - Order physical therapy for flatfeet and associated tendinitis    Follow-up  - Follow up in 2 weeks, no x-rays if better       Assessment:   Problem List Items Addressed This Visit    None  Visit Diagnoses         Right tibialis tendinitis        Relevant Orders    Walking Boot Tall    Referral to Physical Therapy      Flat feet        Relevant Orders    Walking Boot Tall    Referral to Physical Therapy            Diagnostics: Reviewed all relevant imaging including x-ray, MRI, CT, and US.    Results  X-ray of right  foot and ankle (04/17/2025): Small Achilles spur, no other abnormalities.     Procedure:  Procedures  Physical Exam  - Integumentary: Callus on plantar aspect    Musculoskeletal:    - Right Ankle: Lateral tenderness, intact Achilles    - Right Foot: Dorsal pain, discoloration, flatfoot       Orders Placed This Encounter    Walking Boot Tall    Referral to Physical Therapy      At the conclusion of the visit there were no further questions by the patient/family regarding their plan of care.  Patient was instructed to call or return with any issues, questions, or concerns regarding their injury and/or treatment plan described above.     05/14/25 at 2:05 PM - Dhruv Thompson DO    Office: [phone]    This note was prepared using voice recognition software.  The details of this note are correct and have been reviewed, and corrected to the best of my ability.  Some grammatical errors may persist related to the Dragon software.  This medical note was created with the assistance of artificial intelligence (AI) for documentation purposes. The content has been reviewed and confirmed by the healthcare provider for accuracy and completeness. Patient consented to the use of audio recording and use of AI during their visit.

## 2025-05-14 NOTE — LETTER
May 14, 2025     Patient: Viv Baer   YOB: 1975   Date of Visit: 5/14/2025       To Whom It May Concern:    Viv Baer was seen in my clinic on 5/14/2025 at 1:00 pm. She may go back to work on Monday 05/19/25 with the walking boot on at all time and pain as tolerates.    If you have any questions or concerns, please don't hesitate to call.         Sincerely,         Dhruv Thompson, DO

## 2025-05-14 NOTE — PROGRESS NOTES
Physical Therapy     Physical Therapy Evaluation                                        Patient Name: Viv Baer  MRN: [id number]  :  1975  Today's Date: 5/15/2025  Time Calculation  Start Time: 931  Stop Time: 1004  Time Calculation (min): 33 min  PT Evaluation Time Entry  PT Evaluation (Low) Time Entry: 23  PT Therapeutic Procedures Time Entry  Therapeutic Exercise Time Entry: 10     Per chart review:  1. Right tibialis anterior tendinitis  - Prescribe naproxen 500 mg BID for 2 weeks  - Discontinue Celebrex and ibuprofen  - Provide tall boot for 10-14 days  - Order physical therapy  - Advise rest, ice, elevation, use boot when ambulating  - Provide work note until Monday    2. Right posterior tibial tendinitis  - Recommend OTC orthotics for arch support  - Apply same treatment plan as tibialis anterior tendinitis    3. Pes planus  - Recommend OTC orthotics for arch support  - Order physical therapy for flatfeet and associated tendinitis   Reason for Visit  Referred by: Dhruv Thompson DO  Referral DX date: 25     Diagnosis:  1. Right tibialis tendinitis    2. 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 Could be landing on R foot when descending large Jeep. Has boot and used it when walking dog and gave good support.   Current Episode  Date of Onset:  2025  Mechanism of injury:  overuse  Chief Complaint:  R foot pain with walking   Relevant incidents/injuries:  none  Progression of symptoms:  worse  Previous treatments:  none  Relevant medical history:  HTN     Prior level of function: IND with all ADL's  Functional limitations: walking dog, housework, cooking  Home environment: 4 steps to get in, only walking one step at a  time when in boot.   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     Precautions: none  Precautions  STEADI Fall Risk Score (The score of 4 or more indicates an increased risk of falling): 1        Pain:  Location: R foot  Current pain: 5/10; Range: 5-15*/10  Aggravating factor: walking  Alleviating factor: boot, rest, voltaren, meds     Objective:     AROM/PROM  Lower Extremity Right Left   Hip Flex  100 WNL   Hip Ext  30    Knee Flex  130    Knee Ext  0    DF  30    PF  40    Hip Abd  40    Hip Add  20       MMT:   Lower Extremity Right Left   Hip Flex 4/5 4/5   Hip Ext 4/5 4/5   Knee Flex 4/5 4/5   Knee Ext 4/5 4/5   DF 4+/5 4+/5   PF 4+/5 4+/5   Hip Abd 4+/5 4+/5   Hip Add 4/5 4/5     Anterior drawer (pt in supine, 20deg PF): -       Positive=increased joint motion, possible ATF lig injury  Talar tilt (calcaneofibular lig; knee 90deg flex, foot neutral, inversion/eversion of talus): +       Inversion=calcaneofibular lig +       Eversion=deltoid lig +  External rotation stress test (knee flex at 90, PROM into lateral rot): -        Positive=possible syndesmosis injury/high ankle   Squeeze test (pt supine, squeeze tib/fib): -       Positive=possible syndesmosis injury  Powell test (pt prone, squeeze calf): -       Positive=possible achilles tendon third deg tear     Balance:   Impaired due to pain on R     Gait:   Shortened time in SLS on R, short step length on L    Sensation:   Patient denies numbness/tingling of bilateral LOWER extremities.  Light Touch: normal    Coordination:  normal     Outcome Measure:  Other Measures  Lower Extremity Funtional Score (LEFS): 29/80       Treatment:  Provided education regarding POC, goals created, reasoning for assessments performed and results of those assessments. Additionally, provided  education regarding scheduling proposal with pt asked about scheduling preferences, call in/no show policy, and pt provides verbal confirmation of understanding.  - Long Sitting Calf Stretch with Strap  10 reps  - Gastroc Stretch with Foot at Wall  10 reps  - Seated Arch Lifts 10 reps  - Towel Scrunches 10 reps  - Long Sitting Plantar Fascia Stretch with Towel 10 reps  - Foot Roller Plantar Massage  10 reps  - Ankle Eversion with Resistance  10 reps  - Ankle Inversion with Resistance  10 reps  - Ankle Dorsiflexion with Resistance - Long Sitting Calf Stretch with Strap 10 reps    OP Education: HEP:   Access Code: [email or web address]  URL: [email or web address]  Date: 05/15/2025  Prepared by: Tip Mathews    Exercises  - Long Sitting Calf Stretch with Strap  - 1 x daily - 7 x weekly - 3 sets - 10 reps  - Gastroc Stretch with Foot at Wall  - 1 x daily - 7 x weekly - 3 sets - 10 reps  - Seated Arch Lifts  - 1 x daily - 7 x weekly - 3 sets - 10 reps  - Towel Scrunches  - 1 x daily - 7 x weekly - 3 sets - 10 reps  - Long Sitting Plantar Fascia Stretch with Towel  - 1 x daily - 7 x weekly - 3 sets - 10 reps  - Foot Roller Plantar Massage  - 1 x daily - 7 x weekly - 3 sets - 10 reps  - Ankle Eversion with Resistance  - 1 x daily - 7 x weekly - 3 sets - 10 reps  - Ankle Inversion with Resistance  - 1 x daily - 7 x weekly - 3 sets - 10 reps  - Ankle Dorsiflexion with Resistance  - 1 x daily - 7 x weekly - 3 sets - 10 reps    Assessment:  Patient is a 50 y.o. FEMALE presents to [facility] for assessment and treatment of mobility related impairments s/p R ankle and foot pain complicated by overuse during work and recreation.   Patient is alert and oriented x3. Patient presents with medical diagnosis of R Tibialis tendinitis contributing to compensatory soft tissue dysfunction, pain, stiffness and weakness of the R LE. Significant past medical history/past surgical history includes HTN. Skilled care is  needed to progress the patient back to these activities without exacerbating symptoms. Patient requires skilled PT services to address the problems identified and the individualized patient's goals as outlined in the problems and goals section of this evaluation. A skilled PT is required to address these key impairments and to provide and progress with an appropriate home exercise program. Patient does not have significant PMH influencing Rx and reports motivation to return to FUNCTIONAL ACTIVITY and RETURN TO WORK. Patient demonstrates to be a good candidate for physical therapy with good rehab potential and verbalized a good understanding of their diagnosis, prognosis and treatment. Pt is motivated and has strong family support which reinforces their potential for improvement.  Pt would benefit from skilled physical therapy to improve strength, balance and decrease ankle pain.  Goals have been established and reviewed with the patient.      PT Assessment Results: Decreased strength, Decreased range of motion, Decreased endurance, Impaired balance, Decreased mobility  Rehab Prognosis: Good  Evaluation/Treatment Tolerance: Patient tolerated treatment well    Plan:  Frequency/duration:   Treatment/Interventions: Cryotherapy, Dry needling, Education/ Instruction, Electrical stimulation, Fluidotherapy, Gait training, Hot pack, Manual therapy, Neuromuscular re-education, Orthosis fit/ training, Self care/ home management, Taping techniques, Therapeutic activities, Therapeutic exercises, Ultrasound, Vasopneumatic device  PT Plan: Skilled PT  PT Frequency: 1 time per week  Duration: 6 weeks  Onset Date: 05/14/25  Certification Period Start Date: 05/15/25  Certification Period End Date: 08/13/25     Rehab Potential: Good       Goals:  Understand and demonstrate a home exercise program that will support and continue the process of neural plasticity resulting in continued improvement of lower extremity function, increased  mobility, and safety.  Patient will demonstrate the ability to ascend/descend one flight of stairs reciprocally and without an increase in pain to improve function within the home and the community  Patient will report pain of 0/10 at rest, and no greater than 3/10 with any activity including standing, walking, stairs, and transfers  Patient will demonstrate the ability to ambulate >/= 150' without any major deviations in gait to indicate improved mobility within the home and the community   Patient will demonstrate an increased score in LEFS score by 9 points to </=  38/80 to meet established MCID for the outcome measure (baseline 5/15/2025 : 29/80).   Pt will report walking dog for 1 hour while having no more than 2/10 pain during and after to demo improved pain management in order to return to training her new dog.      Complexity:  Low complexity evaluation due to a past medical history WITHOUT personal factors and/or comorbidities that could impact the POC, 23 minutes for evaluation, examination of body systems completed with the patient presenting with a stable condition, and clinical decision making.    Tip Mathews, PT  5/15/2025

## 2025-05-15 ENCOUNTER — EVALUATION (OUTPATIENT)
Dept: PHYSICAL THERAPY | Facility: CLINIC | Age: 50
End: 2025-05-15
Payer: COMMERCIAL

## 2025-05-15 DIAGNOSIS — M21.41 FLAT FEET: ICD-10-CM

## 2025-05-15 DIAGNOSIS — M21.42 FLAT FEET: ICD-10-CM

## 2025-05-15 DIAGNOSIS — M76.821 RIGHT TIBIALIS TENDINITIS: ICD-10-CM

## 2025-05-15 PROCEDURE — 97161 PT EVAL LOW COMPLEX 20 MIN: CPT | Mod: GP

## 2025-05-15 PROCEDURE — 97110 THERAPEUTIC EXERCISES: CPT | Mod: GP

## 2025-05-15 ASSESSMENT — ENCOUNTER SYMPTOMS
LOSS OF SENSATION IN FEET: 0
DEPRESSION: 0
OCCASIONAL FEELINGS OF UNSTEADINESS: 0

## 2025-05-19 ENCOUNTER — APPOINTMENT (OUTPATIENT)
Dept: PHYSICAL THERAPY | Facility: CLINIC | Age: 50
End: 2025-05-19
Payer: COMMERCIAL

## 2025-05-22 ENCOUNTER — TREATMENT (OUTPATIENT)
Dept: PHYSICAL THERAPY | Facility: CLINIC | Age: 50
End: 2025-05-22
Payer: COMMERCIAL

## 2025-05-22 DIAGNOSIS — M21.42 FLAT FEET: Primary | ICD-10-CM

## 2025-05-22 DIAGNOSIS — M21.41 FLAT FEET: Primary | ICD-10-CM

## 2025-05-22 DIAGNOSIS — M76.821 RIGHT TIBIALIS TENDINITIS: ICD-10-CM

## 2025-05-22 PROCEDURE — 97110 THERAPEUTIC EXERCISES: CPT | Mod: GP | Performed by: PHYSICAL THERAPIST

## 2025-05-22 ASSESSMENT — PAIN SCALES - GENERAL: PAINLEVEL_OUTOF10: 2

## 2025-05-22 ASSESSMENT — PAIN - FUNCTIONAL ASSESSMENT: PAIN_FUNCTIONAL_ASSESSMENT: 0-10

## 2025-05-22 NOTE — PROGRESS NOTES
PHYSICAL THERAPY TREATMENT    Patient name:  Viv Baer    MRN:  [id number]    :  1975    Today's Date:  25    Time Calculation  Start Time: 1214  Stop Time: 1253  Time Calculation (min): 39 min     PT Therapeutic Procedures Time Entry  Therapeutic Exercise Time Entry: 38       Referral by:  Referred By: Dhruv Thompson    Diagnoses:  Diagnosis and Precautions: M76.821 (ICD-10-CM) - Right tibialis tendinitis  M21.41,M21.42 (ICD-10-CM) - Flat feet    Assessment/Plan:  Therapeutic exercise performed in order to improve R ankle/foot strength/ROM/mobility.  Patient requires increased tactile/verbal cues with exercise in order to reduce R ankle/foot stress/strain during the particular exercise performed.  Patient challenged with exercises performed in clinic secondary to R ankle/foot fatigue.  Recommended to patient that she consider getting orthotics.  Recommended 2 place for her to go:  [facility] [place], [facility].   Orthotics & prosthetics service in [place], [place] or [facility] [employer]  Orthotics & prosthetics service in [place], [place].     PT Assessment  PT Assessment Results: Decreased strength, Decreased range of motion, Decreased endurance, Impaired balance, Decreased mobility  Rehab Prognosis: Good  OP PT Plan  PT Plan: Skilled PT  Rehab Potential: Good  Plan of Care Agreement: Patient    General Visit Information  PT  Visit  PT Received On: 25    General  Reason for Referral: R foot pain  Referred By: Dhruv Thompson  General Comment: M76.821 (ICD-10-CM) - Right tibialis tendinitis  M21.41,M21.42 (ICD-10-CM) - Flat feet    Insurance Reviewed  Name of insurance:  [facility]   Visit #:   2  Right tibialis tendinitis [M76.821] ; Flat feet [M21.41, M21.42]   [facility] 0 COPAY, 20% COINS, 500 DED. 4500 OO MAX, 100/100 PT VS REMAINING, NO AUTH SARAH [facility] REF#[id number]     Subjective:  Patient reports being off work this past week and that  "overall her R foot is feeling better.  Precautions:     Pain:  Pain Assessment  Pain Assessment: 0-10  0-10 (Numeric) Pain Score: 2  Pain Location: Ankle (foot)  Pain Orientation: Right    No point tenderness over the R ankle or foot    Treatments    Therapeutic Exercise  Therapeutic Exercise Performed: Yes  Therapeutic Exercise Activity 1: R anterior tibialis stretch 30\"x5  Therapeutic Exercise Activity 2: R gastroc stretch standing 30\"x4  Therapeutic Exercise Activity 3: R soleus stretch standing 30\"x4  Therapeutic Exercise Activity 4: standing HR's x 20 reps  Therapeutic Exercise Activity 5: standing TR's x 20 reps  Therapeutic Exercise Activity 6: R single leg stance 10\"x10 reps  Therapeutic Exercise Activity 7: Nose pole touches x 20 reps  Therapeutic Exercise Activity 8: R big toe stretch against side of steps 30\"x5    Treatment  Time in clinic started at:   12:14pm  Time in clinic ended at:   12:53pm  Total time in clinic is:   39 minutes  Total timed code time is:  38 minutes    Treatment Performed Today:.   Therapeutic Exercise x 3 units  "

## 2025-05-28 ENCOUNTER — APPOINTMENT (OUTPATIENT)
Dept: ORTHOPEDIC SURGERY | Facility: CLINIC | Age: 50
End: 2025-05-28
Payer: COMMERCIAL

## 2025-05-28 ENCOUNTER — APPOINTMENT (OUTPATIENT)
Dept: PHYSICAL THERAPY | Facility: CLINIC | Age: 50
End: 2025-05-28
Payer: COMMERCIAL

## 2025-05-28 DIAGNOSIS — M21.41 FLAT FEET: ICD-10-CM

## 2025-05-28 DIAGNOSIS — M76.821 RIGHT TIBIALIS TENDINITIS: ICD-10-CM

## 2025-05-28 DIAGNOSIS — M21.42 FLAT FEET: Primary | ICD-10-CM

## 2025-05-28 DIAGNOSIS — M21.41 FLAT FEET: Primary | ICD-10-CM

## 2025-05-28 DIAGNOSIS — M21.42 FLAT FEET: ICD-10-CM

## 2025-05-28 DIAGNOSIS — M76.821 RIGHT TIBIALIS TENDINITIS: Primary | ICD-10-CM

## 2025-05-28 PROCEDURE — 99213 OFFICE O/P EST LOW 20 MIN: CPT | Performed by: STUDENT IN AN ORGANIZED HEALTH CARE EDUCATION/TRAINING PROGRAM

## 2025-05-28 NOTE — PROGRESS NOTES
Follow-Up Patient Visit  Patient ID: Viv Baer is a 50 y.o. female.    History of Present Illness  The patient is a 50-year-old female presenting for follow-up of right tibialis anterior tendinitis, right posterior tibial tendinitis, and pes planus.    Right Tibialis Anterior Tendinitis  - Previously provided with a tall boot  - Advised to discontinue Celebrex and ibuprofen, and use naproxen  - Reports overall improvement, attributing this to the boot  - Continues to wear the boot during work and while walking her dog, removes it when driving  - Experienced significant ankle discomfort when attempting to forego the boot during an 8-hour work shift  - Estimates improvement at 80-90%    Right Posterior Tibial Tendinitis  - Advised to discontinue Celebrex and ibuprofen, and use naproxen  - Adhering to naproxen regimen, with one instance of Tylenol use  - Reports overall improvement, attributing this to the boot  - Continues to wear the boot during work and while walking her dog, removes it when driving  - Experienced significant ankle discomfort when attempting to forego the boot during an 8-hour work shift  - Estimates improvement at 80-90%    Pes Planus  - Recommended over-the-counter orthotics and physical therapy  - Has not procured orthotics  - Participated in two physical therapy sessions, including an evaluation, with another session scheduled next week    Supplemental information: Reports overall improvement, attributing this to the boot. Continues to wear the boot during work and while walking her dog, removes it when driving. Adhering to naproxen regimen, with one instance of Tylenol use. Experienced significant ankle discomfort when attempting to forego the boot during an 8-hour work shift. Has not procured orthotics. Participated in two physical therapy sessions, including an evaluation, with another session scheduled next week. Estimates improvement at 80-90%.    SOCIAL HISTORY  - Exercise:  Walking the dog    Assessment & Plan  1. Right tibialis anterior tendinitis:  - 80-90% improvement  - Continue physical therapy for 4 weeks  - Begin to wean out of the boot, especially during less strenuous activities  - Use the boot for 7-10 more days as needed, particularly when walking the dog    2. Right posterior tibial tendinitis:  - 80-90% improvement  - Continue physical therapy for 4 weeks  - Begin to wean out of the boot, especially during less strenuous activities  - Use the boot for 7-10 more days as needed, particularly when walking the dog    3. Pes planus:  - 80-90% improvement  - Continue physical therapy for 4 weeks  - Begin to wean out of the boot, especially during less strenuous activities  - Use the boot for 7-10 more days as needed, particularly when walking the dog    Follow-up:  - Re-evaluate in 4 weeks       No orders of the defined types were placed in this encounter.      1. Right tibialis tendinitis    2. Flat feet        Procedures    Physical Exam  - Musculoskeletal:    - Right foot: Improved tenderness, good resistance against pressure.    Results      At the conclusion of the visit there were no further questions by the patient/family regarding their plan of care.  Patient was instructed to call or return with any issues, questions, or concerns regarding their injury and/or treatment plan described above.      This medical note was created with the assistance of artificial intelligence (AI) for documentation purposes. The content has been reviewed and confirmed by the healthcare provider for accuracy and completeness. Patient consented to the use of audio recording and use of AI during their visit.   05/28/25 at 2:39 PM - Dhruv Thompson DO  Office:  [phone]

## 2025-05-28 NOTE — PROGRESS NOTES
"PHYSICAL THERAPY TREATMENT    Patient name:  Viv Baer    MRN:  [id number]    :  1975    Today's Date:  25                  Referral by:       Diagnoses:       Assessment/Plan:  Therapeutic exercise performed in order to improve R ankle/foot strength/ROM/mobility.  Patient requires increased tactile/verbal cues with exercise in order to reduce R ankle/foot stress/strain during the particular exercise performed.  Patient challenged with exercises performed in clinic secondary to R ankle/foot fatigue.  Recommended to patient that she consider getting orthotics.  Recommended 2 place for her to go:  [facility]   Orthotics & prosthetics service in [place], [place] or [facility]  Orthotics & prosthetics service in [place], [place].             General Visit Information            Insurance Reviewed  Name of insurance:  [facility]   Visit #:   3  POC: 6  Right tibialis tendinitis [M76.821] ; Flat feet [M21.41, M21.42]   [facility] 0 COPAY, 20% COINS, 500 DED. 4500 OO MAX, 100/100 PT VS REMAINING, NO AUTH REQ, [facility] REF#[id number]     Subjective:  Patient reports being off work this past week and that overall her R foot is feeling better.  Precautions:     Pain:       No point tenderness over the R ankle or foot    Treatments  Therapeutic Exercise Activity 1: R anterior tibialis stretch 30\"x5  Therapeutic Exercise Activity 2: R gastroc stretch standing 30\"x4  Therapeutic Exercise Activity 3: R soleus stretch standing 30\"x4  Therapeutic Exercise Activity 4: standing HR's x 20 reps  Therapeutic Exercise Activity 5: standing TR's x 20 reps  Therapeutic Exercise Activity 6: R single leg stance 10\"x10 reps  Therapeutic Exercise Activity 7: Nose pole touches x 20 reps  Therapeutic Exercise Activity 8: R big toe stretch against side of steps 30\"x5    Therapeutic Exercise x 3 units        Treatment:  Provided education regarding POC, goals " created, reasoning for assessments performed and results of those assessments. Additionally, provided education regarding scheduling proposal with pt asked about scheduling preferences, call in/no show policy, and pt provides verbal confirmation of understanding.  - Long Sitting Calf Stretch with Strap  10 reps  - Gastroc Stretch with Foot at Wall  10 reps  - Seated Arch Lifts 10 reps  - Towel Scrunches 10 reps  - Long Sitting Plantar Fascia Stretch with Towel 10 reps  - Foot Roller Plantar Massage  10 reps  - Ankle Eversion with Resistance  10 reps  - Ankle Inversion with Resistance  10 reps  - Ankle Dorsiflexion with Resistance - Long Sitting Calf Stretch with Strap 10 reps     OP Education: HEP:   Access Code: [email or web address]  URL: [email or web address]  Date: 05/15/2025  Prepared by: Tip Mathews     Exercises  - Long Sitting Calf Stretch with Strap  - 1 x daily - 7 x weekly - 3 sets - 10 reps  - Gastroc Stretch with Foot at Wall  - 1 x daily - 7 x weekly - 3 sets - 10 reps  - Seated Arch Lifts  - 1 x daily - 7 x weekly - 3 sets - 10 reps  - Towel Scrunches  - 1 x daily - 7 x weekly - 3 sets - 10 reps  - Long Sitting Plantar Fascia Stretch with Towel  - 1 x daily - 7 x weekly - 3 sets - 10 reps  - Foot Roller Plantar Massage  - 1 x daily - 7 x weekly - 3 sets - 10 reps  - Ankle Eversion with Resistance  - 1 x daily - 7 x weekly - 3 sets - 10 reps  - Ankle Inversion with Resistance  - 1 x daily - 7 x weekly - 3 sets - 10 reps  - Ankle Dorsiflexion with Resistance  - 1 x daily - 7 x weekly - 3 sets - 10 reps    Goals:  Understand and demonstrate a home exercise program that will support and continue the process of neural plasticity resulting in continued improvement of lower extremity function, increased mobility, and safety.  Patient will demonstrate the ability to ascend/descend one flight of stairs reciprocally and without an increase in pain to improve function within the home and the  community  Patient will report pain of 0/10 at rest, and no greater than 3/10 with any activity including standing, walking, stairs, and transfers  Patient will demonstrate the ability to ambulate >/= 150' without any major deviations in gait to indicate improved mobility within the home and the community   Patient will demonstrate an increased score in LEFS score by 9 points to </=  38/80 to meet established MCID for the outcome measure (baseline 5/15/2025 : 29/80).   Pt will report walking dog for 1 hour while having no more than 2/10 pain during and after to demo improved pain management in order to return to training her new dog.

## 2025-06-24 DIAGNOSIS — I10 PRIMARY HYPERTENSION: ICD-10-CM

## 2025-06-24 RX ORDER — LOSARTAN POTASSIUM AND HYDROCHLOROTHIAZIDE 12.5; 5 MG/1; MG/1
1 TABLET ORAL DAILY
Qty: 90 TABLET | Refills: 0 | Status: SHIPPED | OUTPATIENT
Start: 2025-06-24 | End: 2025-09-22

## 2025-07-01 ENCOUNTER — APPOINTMENT (OUTPATIENT)
Dept: ORTHOPEDIC SURGERY | Facility: CLINIC | Age: 50
End: 2025-07-01
Payer: COMMERCIAL

## 2025-07-18 ENCOUNTER — APPOINTMENT (OUTPATIENT)
Dept: PRIMARY CARE | Facility: CLINIC | Age: 50
End: 2025-07-18
Payer: COMMERCIAL

## 2025-07-18 VITALS
OXYGEN SATURATION: 92 % | HEIGHT: 61 IN | BODY MASS INDEX: 37.76 KG/M2 | TEMPERATURE: 97.8 F | DIASTOLIC BLOOD PRESSURE: 78 MMHG | SYSTOLIC BLOOD PRESSURE: 122 MMHG | HEART RATE: 61 BPM | WEIGHT: 200 LBS

## 2025-07-18 DIAGNOSIS — Z12.2 SCREENING FOR LUNG CANCER: ICD-10-CM

## 2025-07-18 DIAGNOSIS — F17.200 SMOKER: ICD-10-CM

## 2025-07-18 DIAGNOSIS — E78.2 MIXED HYPERLIPIDEMIA: ICD-10-CM

## 2025-07-18 DIAGNOSIS — Z00.00 ANNUAL PHYSICAL EXAM: Primary | ICD-10-CM

## 2025-07-18 DIAGNOSIS — Z12.31 SCREENING MAMMOGRAM FOR BREAST CANCER: ICD-10-CM

## 2025-07-18 PROCEDURE — 3008F BODY MASS INDEX DOCD: CPT | Performed by: INTERNAL MEDICINE

## 2025-07-18 PROCEDURE — 99396 PREV VISIT EST AGE 40-64: CPT | Performed by: INTERNAL MEDICINE

## 2025-07-18 PROCEDURE — 3078F DIAST BP <80 MM HG: CPT | Performed by: INTERNAL MEDICINE

## 2025-07-18 PROCEDURE — 3074F SYST BP LT 130 MM HG: CPT | Performed by: INTERNAL MEDICINE

## 2025-07-18 ASSESSMENT — COLUMBIA-SUICIDE SEVERITY RATING SCALE - C-SSRS
6. HAVE YOU EVER DONE ANYTHING, STARTED TO DO ANYTHING, OR PREPARED TO DO ANYTHING TO END YOUR LIFE?: NO
2. HAVE YOU ACTUALLY HAD ANY THOUGHTS OF KILLING YOURSELF?: NO
1. IN THE PAST MONTH, HAVE YOU WISHED YOU WERE DEAD OR WISHED YOU COULD GO TO SLEEP AND NOT WAKE UP?: NO

## 2025-07-18 ASSESSMENT — ENCOUNTER SYMPTOMS
NEUROLOGICAL NEGATIVE: 1
PSYCHIATRIC NEGATIVE: 1
RESPIRATORY NEGATIVE: 1
CARDIOVASCULAR NEGATIVE: 1
GASTROINTESTINAL NEGATIVE: 1
HEMATOLOGIC/LYMPHATIC NEGATIVE: 1

## 2025-07-18 NOTE — PROGRESS NOTES
"Subjective   Patient ID: Viv Baer is a 50 y.o. female who presents for Annual Exam (Pt here for yearly physical labs done in May).    HPI patient here for annual physical and follow-up blood pressure is good she is smokes 10 to 15 cigarettes daily she has not done LDCT for lung cancer screening also we advised her about screening colonoscopy for colon cancer screening.    Review of Systems   HENT: Negative.     Respiratory: Negative.     Cardiovascular: Negative.    Gastrointestinal: Negative.    Genitourinary: Negative.    Neurological: Negative.    Hematological: Negative.    Psychiatric/Behavioral: Negative.     All other systems reviewed and are negative.      Objective   /78   Pulse 61   Temp 36.6 °C (97.8 °F) (Temporal)   Ht (!) 1.549 m (5' 1\")   Wt 90.7 kg (200 lb)   SpO2 92%   BMI 37.79 kg/m²     Physical Exam  Vitals reviewed.   Constitutional:       Appearance: Normal appearance.   HENT:      Head: Normocephalic and atraumatic.     Cardiovascular:      Rate and Rhythm: Normal rate and regular rhythm.      Pulses: Normal pulses.      Heart sounds: Normal heart sounds.   Pulmonary:      Effort: Pulmonary effort is normal.      Breath sounds: Normal breath sounds.     Musculoskeletal:      Right lower leg: No edema.      Left lower leg: No edema.     Neurological:      General: No focal deficit present.      Mental Status: She is alert and oriented to person, place, and time. Mental status is at baseline.      Cranial Nerves: No cranial nerve deficit.     Psychiatric:         Mood and Affect: Mood normal.         Behavior: Behavior normal.         Thought Content: Thought content normal.         Assessment/Plan   Problem List Items Addressed This Visit           ICD-10-CM    Smoker F17.200    Annual physical exam - Primary Z00.00     Other Visit Diagnoses         Codes      Screening mammogram for breast cancer     Z12.31    Relevant Orders    BI mammo bilateral screening tomosynthesis      " Screening for lung cancer     Z12.2    Relevant Orders    CT lung screening low dose        We advised smoking cessation LDCT for lung cancer screening mammogram for breast cancer screening and colonoscopy for colon cancer screening also advised smoking cessation she has a hyperlipidemia advised her to take rosuvastatin on regular basis.  Continue losartan hydrochlorothiazide for hypertension.

## 2025-08-06 ENCOUNTER — APPOINTMENT (OUTPATIENT)
Dept: RADIOLOGY | Facility: HOSPITAL | Age: 50
End: 2025-08-06
Payer: COMMERCIAL

## 2025-08-06 DIAGNOSIS — Z12.31 SCREENING MAMMOGRAM FOR BREAST CANCER: ICD-10-CM

## 2025-08-06 PROCEDURE — 77067 SCR MAMMO BI INCL CAD: CPT | Performed by: RADIOLOGY

## 2025-08-06 PROCEDURE — 77067 SCR MAMMO BI INCL CAD: CPT

## 2025-08-06 PROCEDURE — 77063 BREAST TOMOSYNTHESIS BI: CPT | Performed by: RADIOLOGY

## 2025-08-12 ENCOUNTER — APPOINTMENT (OUTPATIENT)
Dept: DERMATOLOGY | Facility: CLINIC | Age: 50
End: 2025-08-12
Payer: COMMERCIAL

## 2025-08-12 DIAGNOSIS — D48.5 NEOPLASM OF UNCERTAIN BEHAVIOR OF SKIN: Primary | ICD-10-CM

## 2025-08-12 PROCEDURE — 69100 BIOPSY OF EXTERNAL EAR: CPT | Performed by: NURSE PRACTITIONER

## 2025-08-12 PROCEDURE — 99203 OFFICE O/P NEW LOW 30 MIN: CPT | Performed by: NURSE PRACTITIONER

## 2025-08-13 ENCOUNTER — HOSPITAL ENCOUNTER (OUTPATIENT)
Dept: RADIOLOGY | Facility: HOSPITAL | Age: 50
Discharge: HOME | End: 2025-08-13
Payer: COMMERCIAL

## 2025-08-13 DIAGNOSIS — Z12.2 SCREENING FOR LUNG CANCER: ICD-10-CM

## 2025-08-13 PROCEDURE — 71271 CT THORAX LUNG CANCER SCR C-: CPT | Performed by: RADIOLOGY

## 2025-08-13 PROCEDURE — 71271 CT THORAX LUNG CANCER SCR C-: CPT

## 2025-08-14 LAB
LABORATORY COMMENT REPORT: NORMAL
PATH REPORT.FINAL DX SPEC: NORMAL
PATH REPORT.GROSS SPEC: NORMAL
PATH REPORT.MICROSCOPIC SPEC OTHER STN: NORMAL
PATH REPORT.RELEVANT HX SPEC: NORMAL
PATH REPORT.TOTAL CANCER: NORMAL

## 2025-08-18 DIAGNOSIS — E78.2 MIXED HYPERLIPIDEMIA: ICD-10-CM

## 2026-01-19 ENCOUNTER — APPOINTMENT (OUTPATIENT)
Dept: PRIMARY CARE | Facility: CLINIC | Age: 51
End: 2026-01-19
Payer: COMMERCIAL